# Patient Record
Sex: FEMALE | Race: WHITE
[De-identification: names, ages, dates, MRNs, and addresses within clinical notes are randomized per-mention and may not be internally consistent; named-entity substitution may affect disease eponyms.]

---

## 2017-10-24 ENCOUNTER — HOSPITAL ENCOUNTER (EMERGENCY)
Dept: HOSPITAL 61 - ER | Age: 32
Discharge: HOME | End: 2017-10-24
Payer: COMMERCIAL

## 2017-10-24 VITALS — DIASTOLIC BLOOD PRESSURE: 72 MMHG | SYSTOLIC BLOOD PRESSURE: 153 MMHG

## 2017-10-24 VITALS — BODY MASS INDEX: 48.82 KG/M2 | WEIGHT: 293 LBS | HEIGHT: 65 IN

## 2017-10-24 DIAGNOSIS — J06.9: Primary | ICD-10-CM

## 2017-10-24 PROCEDURE — 71020: CPT

## 2017-10-24 NOTE — PHYS DOC
Past Medical History


Past Medical History:  Pneumonia


Past Surgical History:  Cholecystectomy, , Tonsillectomy


Additional Past Surgical Histo:  LASIK


Alcohol Use:  None


Drug Use:  None





Adult General


Chief Complaint


Chief Complaint:  COUGH





HPI


HPI





Patient is a 32  year old female presents to the emergency department stating 

that she's had a cough and congestion for over 2 weeks. She states that she has 

been on a round of Zithromax which has not helped a whole lot. She states that 

she is having a nonproductive cough, chills or any nausea vomiting. Patient 

states she's also been using over-the-counter cough medication with no relief. 

Patient states she has increased coughing at night when she is trying to lay 

down.





Review of Systems


Review of Systems





Constitutional: Denies fever or chills []


Eyes: Denies change in visual acuity, redness, or eye pain []


HENT: Denies nasal congestion or sore throat []


Respiratory: Cough and shortness of air with coughing.


Cardiovascular: No additional information not addressed in HPI []


GI: Denies abdominal pain, nausea, vomiting, bloody stools or diarrhea []


: Denies dysuria or hematuria []


Musculoskeletal: Denies back pain or joint pain []


Integument: Denies rash or skin lesions []


Neurologic: Denies headache, focal weakness or sensory changes []


Endocrine: Denies polyuria or polydipsia []





Allergies


Allergies





Allergies








Coded Allergies Type Severity Reaction Last Updated Verified


 


  No Known Drug Allergies    12/29/15 No











Physical Exam


Physical Exam





Constitutional: Well developed, well nourished, no acute distress, non-toxic 

appearance. []


HENT: Normocephalic, atraumatic, bilateral external ears normal, oropharynx 

moist, no oral exudates, nose normal. Bilateral tympanic membranes appear to be 

normal. Throat with redness noted. Patient with no anterior cervical adenopathy 

noted. No frontal or maxillary sinus tenderness noted.


Eyes: PERRLA, EOMI, conjunctiva normal, no discharge. [] 


Neck: Normal range of motion, no tenderness, supple, no stridor. [] 


Cardiovascular:Heart rate regular rhythm, no murmur []


Lungs & Thorax:  Bilateral breath sounds clear to auscultation []


Skin: Warm, dry, no erythema, no rash. [] 


Extremities: No tenderness, no cyanosis, no clubbing, ROM intact, no edema. [] 


Neurologic: Alert and oriented X 3, normal motor function, normal sensory 

function, no focal deficits noted. []


Psychologic: Affect normal, judgement normal, mood normal. []





Current Patient Data


Vital Signs





 Vital Signs








  Date Time  Temp Pulse Resp B/P (MAP) Pulse Ox O2 Delivery O2 Flow Rate FiO2


 


10/24/17 12:50 98.6 101 24  97 Room Air  





 98.6       











EKG


EKG


[]





Radiology/Procedures


Radiology/Procedures


[][facility]


 [address] [place] [address]


 [phone]


 


 IMAGING REPORT





 Signed





PATIENT: HEMALATHA MERINO ACCOUNT: [id number] MRN#: [id number]


: 1985 LOCATION: ER AGE: 32


SEX: F EXAM DT: 10/24/17 ACCESSION#: [id number]


STATUS: REG ER ORD. PHYSICIAN: CHADWICK FOSTER 


REASON: cough and congestion already been on antibiotics


PROCEDURE: CHEST PA & LATERAL





Chest radiograph 10/24/2017 3:05 PM





Indication: Worsening cough, history of pneumonia last year 





Comparison: Chest radiograph 2015





Technique: PA and lateral views of the chest are provided.





Findings:





Cardiomediastinal silhouette is within normal limits. No pleural effusions,


pulmonary vascular congestion or pneumothorax. The lungs are clear.





Osseous structures are normal.





Impression: 





No acute cardiopulmonary process.














DICTATED and SIGNED BY:     EDSON CERDA MD


DATE:     10/24/17 1354





CC: ANGEL GARCIA MD; CHADWICK FOSTER; NON,STAFF ~





Course & Med Decision Making


Course & Med Decision Making


Pertinent Labs and Imaging studies reviewed. (See chart for details)


X-rays were negative for any abnormality per radiology. Patient will be placed 

on Augmentin with a pro-air. She'll also be provided with a prescription for 

prednisone. She'll be discharged home with an upper respiratory infection. 

Signs and symptoms to return back to emergency department as been provided. All 

questions and concerns been answered at patient's bedside. Also recommended the 

patient to use Tessalon Perles to help with her cough and congestion. Patient 

will be encouraged to follow-up with her primary care physician in the next 3-5 

days. Recommended plenty of fluids. Patient will be discharged in stable 

condition. 


[]





Dragon Disclaimer


Dragon Disclaimer


This electronic medical record was generated, in whole or in part, using a 

voice recognition dictation system.





Departure


Departure


Impression:  


 Primary Impression:  


 URI (upper respiratory infection)


Disposition:  01 HOME, SELF-CARE


Condition:  STABLE


Referrals:  


ANGEL GARCIA MD (PCP)


Patient Instructions:  Upper Respiratory Infection, Adult, Easy-to-Read





Additional Instructions:  


Activity as tolerated


Medication as prescribed


Drink plenty of fluids


Sudafed as directed by manufacture over the counter


Followup with primary care provider in 3-5 days


Return to emergency department as needed for signs and symptoms that become 

worse.


Scripts


Albuterol Sulfate (PROAIR HFA INHALER) 8.5 Gm Hfa.aer.ad


1 PUFF INH PRN Q6HRS Y for SHORTNESS OF BREATH, #1 INHALER 0 Refills


   Prov: CHADWICK FOSTER         10/24/17 


Benzonatate (TESSALON PERLE) 100 Mg Capsule


1 CAP PO TID, #30 CAP


   Prov: CHADWICK FOSTER         10/24/17 


Prednisone (PREDNISONE) 20 Mg Tablet


40 MG PO DAILY for 7 Days, #14 TAB


   Prov: CHADWICK FOSTER         10/24/17 


Amoxicillin/Potassium Clav (AUGMENTIN 875-125 TABLET) 1 Each Tablet


1 TAB PO BID, #20 TAB


   Prov: CHADWICK FOSTER         10/24/17





Problem Qualifiers








 Primary Impression:  


 URI (upper respiratory infection)


 URI type:  unspecified URI  Qualified Codes:  J06.9 - Acute upper respiratory 

infection, unspecified








CHADWICK FOSTER Oct 24, 2017 13:26

## 2017-10-24 NOTE — RAD
Chest radiograph 10/24/2017 3:05 PM



Indication: Worsening cough, history of pneumonia last year 



Comparison: Chest radiograph 12/29/2015



Technique: PA and lateral views of the chest are provided.



Findings:



Cardiomediastinal silhouette is within normal limits. No pleural effusions,

pulmonary vascular congestion or pneumothorax. The lungs are clear.



Osseous structures are normal.



Impression: 



No acute cardiopulmonary process.

## 2017-11-10 ENCOUNTER — HOSPITAL ENCOUNTER (EMERGENCY)
Dept: HOSPITAL 61 - ER | Age: 32
Discharge: HOME | End: 2017-11-10
Payer: COMMERCIAL

## 2017-11-10 VITALS — SYSTOLIC BLOOD PRESSURE: 155 MMHG | DIASTOLIC BLOOD PRESSURE: 96 MMHG

## 2017-11-10 DIAGNOSIS — N93.9: Primary | ICD-10-CM

## 2017-11-10 DIAGNOSIS — R10.9: ICD-10-CM

## 2017-11-10 LAB
ANION GAP SERPL CALC-SCNC: 5 MMOL/L (ref 6–14)
BASOPHILS # BLD AUTO: 0.1 X10^3/UL (ref 0–0.2)
BASOPHILS NFR BLD: 1 % (ref 0–3)
BUN SERPL-MCNC: 13 MG/DL (ref 7–20)
CALCIUM SERPL-MCNC: 8.6 MG/DL (ref 8.5–10.1)
CHLORIDE SERPL-SCNC: 105 MMOL/L (ref 98–107)
CO2 BLD-SCNC: 31 MMOL/L (ref 23–32)
CO2 SERPL-SCNC: 31 MMOL/L (ref 21–32)
CREAT SERPL-MCNC: 0.9 MG/DL (ref 0.6–1)
EOSINOPHIL NFR BLD: 2 % (ref 0–3)
ERYTHROCYTE [DISTWIDTH] IN BLOOD BY AUTOMATED COUNT: 15.1 % (ref 11.5–14.5)
GFR SERPLBLD BASED ON 1.73 SQ M-ARVRAT: 73 ML/MIN
GLUCOSE BLD-MCNC: 116 MG/DL (ref 70–99)
GLUCOSE SERPL-MCNC: 121 MG/DL (ref 70–99)
HCT VFR BLD AUTO: 46 % (ref 36–40)
HCT VFR BLD CALC: 43.6 % (ref 36–47)
HGB BLD-MCNC: 14.1 G/DL (ref 12–15.5)
LYMPHOCYTES # BLD: 2.2 X10^3/UL (ref 1–4.8)
LYMPHOCYTES NFR BLD AUTO: 24 % (ref 24–48)
MCH RBC QN AUTO: 28 PG (ref 25–35)
MCHC RBC AUTO-ENTMCNC: 32 G/DL (ref 31–37)
MCV RBC AUTO: 86 FL (ref 79–100)
MONOCYTES NFR BLD: 6 % (ref 0–9)
NEUTROPHILS NFR BLD AUTO: 68 % (ref 31–73)
PLATELET # BLD AUTO: 380 X10^3/UL (ref 140–400)
POTASSIUM BLD-SCNC: 3.7 MMOL/L (ref 3.5–5)
POTASSIUM SERPL-SCNC: 3.8 MMOL/L (ref 3.5–5.1)
RBC # BLD AUTO: 5.07 X10^6/UL (ref 3.5–5.4)
SODIUM SERPL-SCNC: 141 MMOL/L (ref 136–145)
SODIUM SERPL-SCNC: 142 MMOL/L (ref 135–145)
WBC # BLD AUTO: 9.2 X10^3/UL (ref 4–11)

## 2017-11-10 PROCEDURE — 80047 BASIC METABLC PNL IONIZED CA: CPT

## 2017-11-10 PROCEDURE — 81025 URINE PREGNANCY TEST: CPT

## 2017-11-10 PROCEDURE — 85025 COMPLETE CBC W/AUTO DIFF WBC: CPT

## 2017-11-10 PROCEDURE — 99284 EMERGENCY DEPT VISIT MOD MDM: CPT

## 2017-11-10 PROCEDURE — 85018 HEMOGLOBIN: CPT

## 2017-11-10 PROCEDURE — 36415 COLL VENOUS BLD VENIPUNCTURE: CPT

## 2017-11-10 PROCEDURE — 85014 HEMATOCRIT: CPT

## 2017-11-10 PROCEDURE — 80048 BASIC METABOLIC PNL TOTAL CA: CPT

## 2017-11-10 NOTE — PHYS DOC
Past Medical History


Past Medical History:  Pneumonia


Past Surgical History:  Cholecystectomy, , Tonsillectomy


Additional Past Surgical Histo:  LASIK


Alcohol Use:  None


Drug Use:  None





Adult General


Chief Complaint


Chief Complaint:  VAGINAL BLEEDING





HPI


HPI





Patient is a 32  year old female who presents here today secondary to excessive 

vaginal bleeding. Patient reports that she recently DC her Mirena. Patient 

reports after stopping her Mirena she started having periods this is her second 

period now and she reports that she's been having more bleeding than she 

expects. Patient reports that she's gone through approximately one tampon every 

3-4 hours throughout the course of the day however this evening she reports 

that she was changing her tampon every hour to hour and a half and was 

concerned. Patient denies any other symptomatology. Patient denies any fevers 

shakes chills nausea vomiting diarrhea chest pain terns of breath cough cold or 

rhinorrhea. Patient has any dizziness.





Review of Systems


Review of Systems





Constitutional: Denies fever or chills []


Eyes: Denies change in visual acuity, redness, or eye pain []


HENT: Denies nasal congestion or sore throat []


Respiratory: Denies cough or shortness of breath []


Cardiovascular: No additional information not addressed in HPI []


GI: Denies abdominal pain, nausea, vomiting, bloody stools or diarrhea []


: Denies dysuria or hematuria []


Musculoskeletal: Denies back pain or joint pain []


Integument: Denies rash or skin lesions []


Neurologic: Denies headache, focal weakness or sensory changes []


Endocrine: Denies polyuria or polydipsia []





All other systems were reviewed and found to be within normal limits, except as 

documented in this note.





Allergies


Allergies





Allergies








Coded Allergies Type Severity Reaction Last Updated Verified


 


  No Known Drug Allergies    12/29/15 No











Physical Exam


Physical Exam





Constitutional: Well developed, well nourished, no acute distress, non-toxic 

appearance. []


HENT: Normocephalic, atraumatic, bilateral external ears normal, oropharynx 

moist, no oral exudates, nose normal. []


Eyes: PERRLA, EOMI, conjunctiva normal, no discharge. [] 


Neck: Normal range of motion, no tenderness, supple, no stridor. [] 


Cardiovascular:Heart rate regular rhythm, no murmur []


Lungs & Thorax:  Bilateral breath sounds clear to auscultation []


Abdomen: Bowel sounds normal, soft, no tenderness, no masses, no pulsatile 

masses. [] 


Skin: Warm, dry, no erythema, no rash. [] 


Back: No tenderness, no CVA tenderness. [] 


Extremities: No tenderness, no cyanosis, no clubbing, ROM intact, no edema. [] 


Neurologic: Alert and oriented X 3, normal motor function, normal sensory 

function, no focal deficits noted. []


Psychologic: Affect normal, judgement normal, mood normal. []





Patient's ER physical exam is significant for minimal vaginal bleeding on 

pelvic exam. Patient's os is closed. There were no clots. Patient's exam was 

nontender.





Current Patient Data


Lab Values





 Laboratory Tests








Test


  11/10/17


05:29 11/10/17


05:48


 


POC Urine HCG, Qualitative


  Hcg negative


(Negative) 


 


 


POC Hemoglobin


  


  15.6 g/dL


(12-15)  H


 


POC Hematocrit  46 % (36-40)  H


 


POC Sodium


  


  142 mmol/L


(135-145)


 


POC Potassium


  


  3.7 mmol/L


(3.5-5.0)


 


POC Chloride


  


  103 mmol/L


()


 


POC Total CO2


  


  31 mmol/L


(23-32)


 


Anion Gap


  


  13 mmol/L


(6-14)


 


POC Blood Urea Nitrogen


  


  12 mg/dL


(8-26)


 


POC Creatinine


  


  0.9 mg/dL


(0.5-1.4)


 


Glucose Level


  


  116 mg/dL


(70-99)  H


 


POC Ionized Calcium (Garth)


  


  1.11 mmol/L


(1.13-1.32)  L





 Laboratory Tests


11/10/17 05:48














EKG


EKG


[]





Radiology/Procedures


Radiology/Procedures


[]





Course & Med Decision Making


Course & Med Decision Making


Pertinent Labs and Imaging studies reviewed. (See chart for details)





[]This is a 32-year-old female who presents to the ER today for further 

evaluation of excessive vaginal bleeding likely related to be seeing her 

Mirena. I-STAT was obtained which revealed a hemoglobin of 15.6 and hematocrit 

of 46. Patient is clinically hemodynamically stable in the ED. I have reassured 

the patient that her bleeding is likely secondary to hormonal changes. Patient 

was instructed to follow-up with her GYN doctor in the morning to discuss with 

them expectations regarding her vaginal bleeding after stopping the Mirena. 

Patient does not present with any signs or symptoms that were consistent with 

anemia or significant blood loss at this time. Patient is not with any signs or 

symptoms consistent with dizziness, postural hypotension, chest pain or 

shortness of breath. Patient will be discharged home in stable condition.





Dragon Disclaimer


Dragon Disclaimer


This electronic medical record was generated, in whole or in part, using a 

voice recognition dictation system.





Departure


Departure


Impression:  


 Primary Impression:  


 Abdominal pain


 Additional Impression:  


 Abnormal vaginal bleeding


Disposition:   HOME, SELF-CARE


Condition:  IMPROVED


Referrals:  


ANGEL GARCIA MD (PCP)


Patient Instructions:  Abnormal Uterine Bleeding





Additional Instructions:  


Please follow up with your family doctor/GYN doctor in the morning and discuss 

with him your concerns. Return to the ER if you have any shortness of breath, 

dizziness, or feeling like he might pass out. Return the ER if you have 

increased bleeding that is concerning to.





Problem Qualifiers











CORY GAUTHIER MD Nov 10, 2017 06:06

## 2018-07-08 ENCOUNTER — HOSPITAL ENCOUNTER (EMERGENCY)
Dept: HOSPITAL 61 - ER | Age: 33
Discharge: HOME | End: 2018-07-08
Payer: COMMERCIAL

## 2018-07-08 DIAGNOSIS — J20.9: Primary | ICD-10-CM

## 2018-07-08 PROCEDURE — 71045 X-RAY EXAM CHEST 1 VIEW: CPT

## 2018-07-08 PROCEDURE — 96372 THER/PROPH/DIAG INJ SC/IM: CPT

## 2018-07-08 PROCEDURE — 99283 EMERGENCY DEPT VISIT LOW MDM: CPT

## 2018-07-08 PROCEDURE — 94640 AIRWAY INHALATION TREATMENT: CPT

## 2018-07-08 RX ADMIN — METHYLPREDNISOLONE SODIUM SUCCINATE 1 MG: 125 INJECTION, POWDER, FOR SOLUTION INTRAMUSCULAR; INTRAVENOUS at 08:46

## 2018-07-08 RX ADMIN — IPRATROPIUM BROMIDE AND ALBUTEROL SULFATE 1 ML: .5; 3 SOLUTION RESPIRATORY (INHALATION) at 08:33
